# Patient Record
Sex: FEMALE | Race: WHITE | NOT HISPANIC OR LATINO | URBAN - METROPOLITAN AREA
[De-identification: names, ages, dates, MRNs, and addresses within clinical notes are randomized per-mention and may not be internally consistent; named-entity substitution may affect disease eponyms.]

---

## 2018-10-12 ENCOUNTER — OUTPATIENT (OUTPATIENT)
Dept: OUTPATIENT SERVICES | Facility: HOSPITAL | Age: 53
LOS: 1 days | End: 2018-10-12
Payer: COMMERCIAL

## 2018-10-12 ENCOUNTER — APPOINTMENT (OUTPATIENT)
Dept: CT IMAGING | Facility: CLINIC | Age: 53
End: 2018-10-12
Payer: COMMERCIAL

## 2018-10-12 DIAGNOSIS — Z00.8 ENCOUNTER FOR OTHER GENERAL EXAMINATION: ICD-10-CM

## 2018-10-12 DIAGNOSIS — Z87.42 PERSONAL HISTORY OF OTHER DISEASES OF THE FEMALE GENITAL TRACT: Chronic | ICD-10-CM

## 2018-10-12 PROCEDURE — 74178 CT ABD&PLV WO CNTR FLWD CNTR: CPT | Mod: 26

## 2018-10-12 PROCEDURE — 82565 ASSAY OF CREATININE: CPT

## 2018-10-12 PROCEDURE — 74178 CT ABD&PLV WO CNTR FLWD CNTR: CPT

## 2018-10-16 ENCOUNTER — RESULT REVIEW (OUTPATIENT)
Age: 53
End: 2018-10-16

## 2022-07-07 ENCOUNTER — NON-APPOINTMENT (OUTPATIENT)
Age: 57
End: 2022-07-07

## 2022-07-15 ENCOUNTER — NON-APPOINTMENT (OUTPATIENT)
Age: 57
End: 2022-07-15

## 2022-07-19 ENCOUNTER — APPOINTMENT (OUTPATIENT)
Dept: SURGERY | Facility: CLINIC | Age: 57
End: 2022-07-19

## 2022-07-19 VITALS
DIASTOLIC BLOOD PRESSURE: 83 MMHG | HEART RATE: 87 BPM | SYSTOLIC BLOOD PRESSURE: 171 MMHG | HEIGHT: 69 IN | BODY MASS INDEX: 22.66 KG/M2 | WEIGHT: 153 LBS | TEMPERATURE: 98.1 F | OXYGEN SATURATION: 98 %

## 2022-07-19 DIAGNOSIS — K82.4 CHOLESTEROLOSIS OF GALLBLADDER: ICD-10-CM

## 2022-07-19 DIAGNOSIS — Z86.39 PERSONAL HISTORY OF OTHER ENDOCRINE, NUTRITIONAL AND METABOLIC DISEASE: ICD-10-CM

## 2022-07-19 DIAGNOSIS — Z80.0 FAMILY HISTORY OF MALIGNANT NEOPLASM OF DIGESTIVE ORGANS: ICD-10-CM

## 2022-07-19 DIAGNOSIS — Z82.49 FAMILY HISTORY OF ISCHEMIC HEART DISEASE AND OTHER DISEASES OF THE CIRCULATORY SYSTEM: ICD-10-CM

## 2022-07-19 DIAGNOSIS — Z86.79 PERSONAL HISTORY OF OTHER DISEASES OF THE CIRCULATORY SYSTEM: ICD-10-CM

## 2022-07-19 PROCEDURE — 99204 OFFICE O/P NEW MOD 45 MIN: CPT

## 2022-07-19 NOTE — ASSESSMENT
[FreeTextEntry1] : I have discussed with pt that as the size of a gallbladder polyp goes up so does the risk of cancer. Since her polyp is 7 mm I would therefore recommend she have her gallbladder removed.\par I have discussed all of the risks, benefits and option. Pt would like to schedule a Robotic assisted laparoscopic cholecystectomy.

## 2022-07-19 NOTE — PHYSICAL EXAM
[JVD] : no jugular venous distention  [Normal Thyroid] : the thyroid was normal [Carotid Bruits] : no carotid bruits [Normal Breath Sounds] : Normal breath sounds [Normal Heart Sounds] : normal heart sounds [Normal Rate and Rhythm] : normal rate and rhythm [No Rash or Lesion] : No rash or lesion [Alert] : alert [Oriented to Person] : oriented to person [Oriented to Place] : oriented to place [Oriented to Time] : oriented to time [Calm] : calm [de-identified] : well developed white female in no distress [de-identified] : noninjected and nonicteric [de-identified] : without adenopathy [de-identified] : without masses or nipple discharge. Axilla without adenopathy [de-identified] : normal bowel sounds, without distension or tenderness. [de-identified] : without hernia [de-identified] : without calf pain or swelling

## 2022-07-19 NOTE — HISTORY OF PRESENT ILLNESS
[de-identified] : gallbladder polyp [de-identified] : 57 year old white female who presents after a sonogram showed she has a gallbladder polyp. Pt states her live enzymes where elevated so her PMD order the sonogram. She denies any RUQ pain or fatty food intolerance. She has not had any episodes of jaundice. She does have a family history of gallbladder disease.

## 2022-08-31 ENCOUNTER — OUTPATIENT (OUTPATIENT)
Dept: OUTPATIENT SERVICES | Facility: HOSPITAL | Age: 57
LOS: 1 days | End: 2022-08-31
Payer: COMMERCIAL

## 2022-08-31 VITALS
WEIGHT: 153 LBS | OXYGEN SATURATION: 99 % | RESPIRATION RATE: 16 BRPM | SYSTOLIC BLOOD PRESSURE: 140 MMHG | DIASTOLIC BLOOD PRESSURE: 74 MMHG | HEART RATE: 72 BPM | TEMPERATURE: 97 F

## 2022-08-31 DIAGNOSIS — K82.4 CHOLESTEROLOSIS OF GALLBLADDER: ICD-10-CM

## 2022-08-31 DIAGNOSIS — Z90.721 ACQUIRED ABSENCE OF OVARIES, UNILATERAL: Chronic | ICD-10-CM

## 2022-08-31 DIAGNOSIS — Z01.818 ENCOUNTER FOR OTHER PREPROCEDURAL EXAMINATION: ICD-10-CM

## 2022-08-31 DIAGNOSIS — Z98.890 OTHER SPECIFIED POSTPROCEDURAL STATES: Chronic | ICD-10-CM

## 2022-08-31 DIAGNOSIS — Z87.42 PERSONAL HISTORY OF OTHER DISEASES OF THE FEMALE GENITAL TRACT: Chronic | ICD-10-CM

## 2022-08-31 DIAGNOSIS — E11.9 TYPE 2 DIABETES MELLITUS WITHOUT COMPLICATIONS: ICD-10-CM

## 2022-08-31 LAB
A1C WITH ESTIMATED AVERAGE GLUCOSE RESULT: 8.6 % — HIGH (ref 4–5.6)
ALBUMIN SERPL ELPH-MCNC: 4.1 G/DL — SIGNIFICANT CHANGE UP (ref 3.3–5)
ALP SERPL-CCNC: 310 U/L — HIGH (ref 40–120)
ALT FLD-CCNC: 76 U/L — SIGNIFICANT CHANGE UP (ref 12–78)
ANION GAP SERPL CALC-SCNC: 5 MMOL/L — SIGNIFICANT CHANGE UP (ref 5–17)
AST SERPL-CCNC: 44 U/L — HIGH (ref 15–37)
BILIRUB SERPL-MCNC: 1.5 MG/DL — HIGH (ref 0.2–1.2)
BLD GP AB SCN SERPL QL: SIGNIFICANT CHANGE UP
BUN SERPL-MCNC: 16 MG/DL — SIGNIFICANT CHANGE UP (ref 7–23)
CALCIUM SERPL-MCNC: 10.1 MG/DL — SIGNIFICANT CHANGE UP (ref 8.5–10.1)
CHLORIDE SERPL-SCNC: 106 MMOL/L — SIGNIFICANT CHANGE UP (ref 96–108)
CO2 SERPL-SCNC: 29 MMOL/L — SIGNIFICANT CHANGE UP (ref 22–31)
CREAT SERPL-MCNC: 0.66 MG/DL — SIGNIFICANT CHANGE UP (ref 0.5–1.3)
EGFR: 101 ML/MIN/1.73M2 — SIGNIFICANT CHANGE UP
ESTIMATED AVERAGE GLUCOSE: 200 MG/DL — HIGH (ref 68–114)
GLUCOSE SERPL-MCNC: 205 MG/DL — HIGH (ref 70–99)
HCT VFR BLD CALC: 45 % — SIGNIFICANT CHANGE UP (ref 34.5–45)
HGB BLD-MCNC: 15.1 G/DL — SIGNIFICANT CHANGE UP (ref 11.5–15.5)
MCHC RBC-ENTMCNC: 31 PG — SIGNIFICANT CHANGE UP (ref 27–34)
MCHC RBC-ENTMCNC: 33.6 GM/DL — SIGNIFICANT CHANGE UP (ref 32–36)
MCV RBC AUTO: 92.4 FL — SIGNIFICANT CHANGE UP (ref 80–100)
NRBC # BLD: 0 /100 WBCS — SIGNIFICANT CHANGE UP (ref 0–0)
PLATELET # BLD AUTO: 317 K/UL — SIGNIFICANT CHANGE UP (ref 150–400)
POTASSIUM SERPL-MCNC: 4.4 MMOL/L — SIGNIFICANT CHANGE UP (ref 3.5–5.3)
POTASSIUM SERPL-SCNC: 4.4 MMOL/L — SIGNIFICANT CHANGE UP (ref 3.5–5.3)
PROT SERPL-MCNC: 8.1 G/DL — SIGNIFICANT CHANGE UP (ref 6–8.3)
RBC # BLD: 4.87 M/UL — SIGNIFICANT CHANGE UP (ref 3.8–5.2)
RBC # FLD: 12 % — SIGNIFICANT CHANGE UP (ref 10.3–14.5)
SODIUM SERPL-SCNC: 140 MMOL/L — SIGNIFICANT CHANGE UP (ref 135–145)
WBC # BLD: 6.86 K/UL — SIGNIFICANT CHANGE UP (ref 3.8–10.5)
WBC # FLD AUTO: 6.86 K/UL — SIGNIFICANT CHANGE UP (ref 3.8–10.5)

## 2022-08-31 PROCEDURE — 86901 BLOOD TYPING SEROLOGIC RH(D): CPT

## 2022-08-31 PROCEDURE — 36415 COLL VENOUS BLD VENIPUNCTURE: CPT

## 2022-08-31 PROCEDURE — 86850 RBC ANTIBODY SCREEN: CPT

## 2022-08-31 PROCEDURE — 85027 COMPLETE CBC AUTOMATED: CPT

## 2022-08-31 PROCEDURE — G0463: CPT

## 2022-08-31 PROCEDURE — 80053 COMPREHEN METABOLIC PANEL: CPT

## 2022-08-31 PROCEDURE — 86900 BLOOD TYPING SEROLOGIC ABO: CPT

## 2022-08-31 PROCEDURE — 83036 HEMOGLOBIN GLYCOSYLATED A1C: CPT

## 2022-08-31 RX ORDER — RAMIPRIL 5 MG
0 CAPSULE ORAL
Qty: 0 | Refills: 0 | DISCHARGE

## 2022-08-31 RX ORDER — METFORMIN HYDROCHLORIDE 850 MG/1
0 TABLET ORAL
Qty: 0 | Refills: 0 | DISCHARGE

## 2022-08-31 RX ORDER — ATORVASTATIN CALCIUM 80 MG/1
0 TABLET, FILM COATED ORAL
Qty: 0 | Refills: 0 | DISCHARGE

## 2022-08-31 NOTE — H&P PST ADULT - MUSCULOSKELETAL
negative ROM intact/no calf tenderness/decreased ROM normal/ROM intact/no calf tenderness/normal gait/strength 5/5 bilateral upper extremities/strength 5/5 bilateral lower extremities

## 2022-08-31 NOTE — H&P PST ADULT - PROBLEM SELECTOR PLAN 3
Medical clearance needed as per surgeon. CBC, Comprehensive panel, T&S and HgbA1c ordered. EKG from 6/29/22 received in chart. Pre-op instructions and surgical scrubs given and pt verbalized understanding. COVID19 PCR testing to be done within 72 hours of surgery at [facility].

## 2022-08-31 NOTE — H&P PST ADULT - NSICDXPASTMEDICALHX_GEN_ALL_CORE_FT
PAST MEDICAL HISTORY:  HTN (hypertension)     Prediabetes      PAST MEDICAL HISTORY:  Cholesterolosis of gallbladder     HTN (hypertension)     Hyperlipidemia     Type 2 diabetes mellitus

## 2022-08-31 NOTE — H&P PST ADULT - BP NONINVASIVE MEAN (MM HG)
Continue Regimen: Applying sunscreen daily Plan: Discussed cosmetic options Initiate Treatment: Apply tri-ruben daily to the face at night Detail Level: Zone 96

## 2022-08-31 NOTE — H&P PST ADULT - HISTORY OF PRESENT ILLNESS
56 y/o female with PMH of HTN and Type 2 DM here for PST. Pt states she had abnormal LFT's and s/p sonogram "which showed 2 polyps".  58 y/o female with PMH of HTN and Type 2 DM here for PST. Pt states she had abnormal LFT's and s/p sonogram "which showed 2 polyps". Pt denies n/v/d and abdominal pain. Pt electing for robotic assisted laparoscopic cholecystectomy on 9/16/2022.

## 2022-08-31 NOTE — H&P PST ADULT - NSICDXPASTSURGICALHX_GEN_ALL_CORE_FT
PAST SURGICAL HISTORY:  H/O menorrhagia h/o ablation, s/p Novosure 5/2013    S/P left oophorectomy (2014)     PAST SURGICAL HISTORY:  H/O menorrhagia h/o ablation, s/p Novosure 5/2013    S/P colonoscopy (2017)    S/P left oophorectomy (2014)

## 2022-08-31 NOTE — H&P PST ADULT - PROBLEM SELECTOR PLAN 2
Instructed pt of need for endocrine clearance if HgbA1c is 9 or greater. No Metformin morning of surgery. Hold Jardiance 3 days before surgery. Pt verbalized understanding.

## 2022-08-31 NOTE — H&P PST ADULT - FUNCTIONAL ASSESSMENT - BASIC MOBILITY SCORE HIDDEN
[FreeTextEntry1] : Patient is a 66-year-old, status post right carotid endarterectomy.  Patient currently on aspirin.  No CVA or TIA. 24

## 2022-08-31 NOTE — H&P PST ADULT - NSSUBSTANCEUSE_GEN_ALL_CORE_SD
Review of Systems: GENERAL:  Patient denies fevers, night sweats, anorexia, weight loss, but complains of:  chills and excessive fatigue  EYES:  Patient denies significant visual difficulties, diplopia  ENT/MOUTH:  Patient denies problems with hearing, sore throat, mucositis or mouth sores, sinus drainage  ENDOCRINE:  Patient denies diabetes, thyroid disease, hormone replacement, hot flashes or night sweats  HEMATOLOGIC/LYMPHATIC: Patient denies easy bruising or bleeding, tender or palpable lymph nodes  RESPIRATORY:  Patient denies  chest pain, cough, hemoptysis, but complains of:  dyspnea  CARDIOVASCULAR:  Patient denies anginal chest pain, palpitations, orthopnea, peripheral edema   GASTROINTESTINAL: Patient denies vomiting, GI bleeding, constipation, change in bowel habits, heartburn, early satiety, but complains of:  nausea and diarrhea   (MALE): Patient denies hematuria, dysuria, increased frequency, urgency, hesitancy, incontinence  MUSCULOSKELETAL:  Patient denies  joint pain, swelling or redness, decreased range of motion  SKIN:  Patient denies chronic rashes, inflammation, ulcerations or skin changes  NEUROLOGIC:  Patient denies headache, blurred vision, areas of focal weakness or numbness, abnormal gait, sensory problems     Denies caffeine and drug use

## 2022-09-13 PROBLEM — E11.9 TYPE 2 DIABETES MELLITUS WITHOUT COMPLICATIONS: Chronic | Status: ACTIVE | Noted: 2022-08-31

## 2022-09-13 PROBLEM — K82.4 CHOLESTEROLOSIS OF GALLBLADDER: Chronic | Status: ACTIVE | Noted: 2022-08-31

## 2022-09-13 PROBLEM — E78.5 HYPERLIPIDEMIA, UNSPECIFIED: Chronic | Status: ACTIVE | Noted: 2022-08-31

## 2022-09-14 ENCOUNTER — OUTPATIENT (OUTPATIENT)
Dept: OUTPATIENT SERVICES | Facility: HOSPITAL | Age: 57
LOS: 1 days | End: 2022-09-14
Payer: COMMERCIAL

## 2022-09-14 DIAGNOSIS — Z98.890 OTHER SPECIFIED POSTPROCEDURAL STATES: Chronic | ICD-10-CM

## 2022-09-14 DIAGNOSIS — Z90.721 ACQUIRED ABSENCE OF OVARIES, UNILATERAL: Chronic | ICD-10-CM

## 2022-09-14 DIAGNOSIS — Z87.42 PERSONAL HISTORY OF OTHER DISEASES OF THE FEMALE GENITAL TRACT: Chronic | ICD-10-CM

## 2022-09-14 DIAGNOSIS — Z20.828 CONTACT WITH AND (SUSPECTED) EXPOSURE TO OTHER VIRAL COMMUNICABLE DISEASES: ICD-10-CM

## 2022-09-14 LAB — SARS-COV-2 RNA SPEC QL NAA+PROBE: SIGNIFICANT CHANGE UP

## 2022-09-14 PROCEDURE — U0003: CPT

## 2022-09-14 PROCEDURE — U0005: CPT

## 2022-09-15 ENCOUNTER — TRANSCRIPTION ENCOUNTER (OUTPATIENT)
Age: 57
End: 2022-09-15

## 2022-09-15 NOTE — ASU PATIENT PROFILE, ADULT - ALCOHOL USE HISTORY SINGLE SELECT
[Initial Visit] : an initial visit for [Other: ____] : [unfilled] [FreeTextEntry2] : pain on both elbows never

## 2022-09-15 NOTE — ASU PATIENT PROFILE, ADULT - FALL HARM RISK - UNIVERSAL INTERVENTIONS
Bed in lowest position, wheels locked, appropriate side rails in place/Call bell, personal items and telephone in reach/Instruct patient to call for assistance before getting out of bed or chair/Non-slip footwear when patient is out of bed/[place] to call system/Physically safe environment - no spills, clutter or unnecessary equipment/Purposeful Proactive Rounding/Room/bathroom lighting operational, light cord in reach

## 2022-09-15 NOTE — ASU PATIENT PROFILE, ADULT - PATIENT'S PREFERRED PRONOUN
3/23/2021    Lucy Chapin  [address] [place] [phone]        Dear uLcy Chapin,    This is a reminder  for you to schedule an appointment with GASTROENTEROLOGY for the following:     Colonoscopy    Please call one of the offices listed below to schedule a date and time that is convenient for you.     Sincerely,    MD CASTILLO LESLIE MD NALINI GUDA, MD TEDD CAIN, MD       Department of Gastroenterology    [address] [id number] [phone]    Department of Gastroenterology  [place] [address] [place], [place]  [id number] [phone]         Her/She

## 2022-09-16 ENCOUNTER — RESULT REVIEW (OUTPATIENT)
Age: 57
End: 2022-09-16

## 2022-09-16 ENCOUNTER — TRANSCRIPTION ENCOUNTER (OUTPATIENT)
Age: 57
End: 2022-09-16

## 2022-09-16 ENCOUNTER — OUTPATIENT (OUTPATIENT)
Dept: OUTPATIENT SERVICES | Facility: HOSPITAL | Age: 57
LOS: 1 days | End: 2022-09-16
Payer: COMMERCIAL

## 2022-09-16 ENCOUNTER — APPOINTMENT (OUTPATIENT)
Dept: SURGERY | Facility: HOSPITAL | Age: 57
End: 2022-09-16

## 2022-09-16 VITALS
RESPIRATION RATE: 16 BRPM | HEIGHT: 69 IN | WEIGHT: 153 LBS | DIASTOLIC BLOOD PRESSURE: 71 MMHG | HEART RATE: 67 BPM | SYSTOLIC BLOOD PRESSURE: 145 MMHG | OXYGEN SATURATION: 98 % | TEMPERATURE: 98 F

## 2022-09-16 VITALS
OXYGEN SATURATION: 96 % | RESPIRATION RATE: 12 BRPM | SYSTOLIC BLOOD PRESSURE: 157 MMHG | DIASTOLIC BLOOD PRESSURE: 77 MMHG | TEMPERATURE: 97 F | HEART RATE: 69 BPM

## 2022-09-16 DIAGNOSIS — Z98.890 OTHER SPECIFIED POSTPROCEDURAL STATES: Chronic | ICD-10-CM

## 2022-09-16 DIAGNOSIS — Z90.721 ACQUIRED ABSENCE OF OVARIES, UNILATERAL: Chronic | ICD-10-CM

## 2022-09-16 DIAGNOSIS — Z87.42 PERSONAL HISTORY OF OTHER DISEASES OF THE FEMALE GENITAL TRACT: Chronic | ICD-10-CM

## 2022-09-16 DIAGNOSIS — K82.4 CHOLESTEROLOSIS OF GALLBLADDER: ICD-10-CM

## 2022-09-16 LAB — ABO RH CONFIRMATION: SIGNIFICANT CHANGE UP

## 2022-09-16 PROCEDURE — C9399: CPT

## 2022-09-16 PROCEDURE — 47563 LAPARO CHOLECYSTECTOMY/GRAPH: CPT

## 2022-09-16 PROCEDURE — 82962 GLUCOSE BLOOD TEST: CPT

## 2022-09-16 PROCEDURE — C1889: CPT

## 2022-09-16 PROCEDURE — 88304 TISSUE EXAM BY PATHOLOGIST: CPT | Mod: 26

## 2022-09-16 PROCEDURE — S2900: CPT

## 2022-09-16 PROCEDURE — 88304 TISSUE EXAM BY PATHOLOGIST: CPT

## 2022-09-16 DEVICE — LIGATING CLIPS WECK HEMOLOK POLYMER MEDIUM-LARGE (GREEN) 6: Type: IMPLANTABLE DEVICE | Status: FUNCTIONAL

## 2022-09-16 RX ORDER — IBUPROFEN 200 MG
1 TABLET ORAL
Qty: 20 | Refills: 0
Start: 2022-09-16

## 2022-09-16 RX ORDER — RAMIPRIL 5 MG
0 CAPSULE ORAL
Qty: 0 | Refills: 0 | DISCHARGE

## 2022-09-16 RX ORDER — ATORVASTATIN CALCIUM 80 MG/1
0 TABLET, FILM COATED ORAL
Qty: 0 | Refills: 0 | DISCHARGE

## 2022-09-16 RX ORDER — HYDROMORPHONE HYDROCHLORIDE 2 MG/ML
0.5 INJECTION INTRAMUSCULAR; INTRAVENOUS; SUBCUTANEOUS
Refills: 0 | Status: DISCONTINUED | OUTPATIENT
Start: 2022-09-16 | End: 2022-09-16

## 2022-09-16 RX ORDER — INDOCYANINE GREEN 25 MG
25 KIT INTRAVASCULAR; INTRAVENOUS ONCE
Refills: 0 | Status: DISCONTINUED | OUTPATIENT
Start: 2022-09-16 | End: 2022-09-16

## 2022-09-16 RX ORDER — SODIUM CHLORIDE 9 MG/ML
1000 INJECTION, SOLUTION INTRAVENOUS
Refills: 0 | Status: DISCONTINUED | OUTPATIENT
Start: 2022-09-16 | End: 2022-09-16

## 2022-09-16 RX ORDER — ONDANSETRON 8 MG/1
4 TABLET, FILM COATED ORAL ONCE
Refills: 0 | Status: DISCONTINUED | OUTPATIENT
Start: 2022-09-16 | End: 2022-09-16

## 2022-09-16 RX ORDER — OXYCODONE AND ACETAMINOPHEN 5; 325 MG/1; MG/1
1 TABLET ORAL
Qty: 20 | Refills: 0
Start: 2022-09-16

## 2022-09-16 RX ORDER — INDOCYANINE GREEN 25 MG
5 KIT INTRAVASCULAR; INTRAVENOUS ONCE
Refills: 0 | Status: COMPLETED | OUTPATIENT
Start: 2022-09-16 | End: 2022-09-16

## 2022-09-16 RX ORDER — DOCUSATE SODIUM 100 MG
1 CAPSULE ORAL
Qty: 60 | Refills: 0
Start: 2022-09-16

## 2022-09-16 RX ORDER — OXYCODONE HYDROCHLORIDE 5 MG/1
5 TABLET ORAL ONCE
Refills: 0 | Status: DISCONTINUED | OUTPATIENT
Start: 2022-09-16 | End: 2022-09-16

## 2022-09-16 RX ORDER — METFORMIN HYDROCHLORIDE 850 MG/1
0 TABLET ORAL
Qty: 0 | Refills: 0 | DISCHARGE

## 2022-09-16 RX ORDER — EMPAGLIFLOZIN 10 MG/1
0 TABLET, FILM COATED ORAL
Qty: 0 | Refills: 1 | DISCHARGE

## 2022-09-16 RX ADMIN — SODIUM CHLORIDE 75 MILLILITER(S): 9 INJECTION, SOLUTION INTRAVENOUS at 07:10

## 2022-09-16 RX ADMIN — SODIUM CHLORIDE 75 MILLILITER(S): 9 INJECTION, SOLUTION INTRAVENOUS at 09:36

## 2022-09-16 RX ADMIN — INDOCYANINE GREEN 5 MILLIGRAM(S): KIT INTRAVASCULAR; INTRAVENOUS at 07:08

## 2022-09-16 NOTE — BRIEF OPERATIVE NOTE - NSICDXBRIEFPOSTOP_GEN_ALL_CORE_FT
POST-OP DIAGNOSIS:  Cholesterolosis of gallbladder 16-Sep-2022 10:32:48  Gabino Hernadez  Abdominal adhesions 16-Sep-2022 10:35:44 omental adhesions to anterior abdominal wall Gabino Hernadez

## 2022-09-16 NOTE — BRIEF OPERATIVE NOTE - OPERATION/FINDINGS
Fluid-filled gallbladder.  Triangle of safety/critical view obtained prior to ligation and transection of cystic artery and cystic duct.  Location of cystic duct further confirmed with intra-operative cholangiogram utilizing ICG IV dye administered pre-op in holding, and "firefly" mode on DaVinci camera.  Omental adhesions to anterior abdominal wall.

## 2022-09-16 NOTE — BRIEF OPERATIVE NOTE - NSICDXBRIEFPROCEDURE_GEN_ALL_CORE_FT
PROCEDURES:  Robot-assisted cholecystectomy with cholangiography using da Sydney Xi 16-Sep-2022 10:33:04  Gabino Hernadez  Laparoscopic lysis of abdominal adhesions 16-Sep-2022 10:36:54  Gabino Hernadez

## 2022-09-16 NOTE — ASU DISCHARGE PLAN (ADULT/PEDIATRIC) - CARE PROVIDER_API CALL
Pedrito Gama)  Surgery  [address] [place] [id number]  Phone: [phone]  Fax: [phone]  Follow Up Time:

## 2022-09-16 NOTE — ASU DISCHARGE PLAN (ADULT/PEDIATRIC) - ASU DC SPECIAL INSTRUCTIONSFT
You may remove the clear adhesive and gauze dressings and shower in 48 hours.  Do not remove steri-strips.  Do not let water hit wounds directly, do not rub incisions.      No heavy lifting (nothing heavier than 5 lbs.), no strenuous physical activity, no exercise.      You may perform your usual daily tasks as tolerated.    Eat a low-fat diet for the next few weeks.            Do not drive for 24 hours following anesthesia.  Do not drive while taking narcotic pain meds.  Do not drive until pain-free.      You are encouraged to walk as much as possible to prevent blood clots in the legs, to maintain lung health after surgery/anesthesia, and to prevent constipation after surgery.      Continue to use the incentive spirometer at home.    Take percocet as prescribed for pain.  Take colace as prescribed while taking narcotic pain medication to prevent constipation.  Take ibuprofen as prescribed for moderate pain - take with food.      Resume all of your usual daily medications as instructed in the discharge medication reconciliation.    Follow-up with Dr. Gama in his office next week - call office for appointment if not already made.

## 2022-09-19 ENCOUNTER — NON-APPOINTMENT (OUTPATIENT)
Age: 57
End: 2022-09-19

## 2022-09-19 LAB — SURGICAL PATHOLOGY STUDY: SIGNIFICANT CHANGE UP

## 2022-09-22 ENCOUNTER — APPOINTMENT (OUTPATIENT)
Dept: SURGERY | Facility: CLINIC | Age: 57
End: 2022-09-22

## 2022-09-22 DIAGNOSIS — Z90.49 ACQUIRED ABSENCE OF OTHER SPECIFIED PARTS OF DIGESTIVE TRACT: ICD-10-CM

## 2022-09-22 PROCEDURE — 99024 POSTOP FOLLOW-UP VISIT: CPT

## 2022-09-22 NOTE — HISTORY OF PRESENT ILLNESS
[de-identified] : s/p robotic assisted laparoscopic cholecystectomy on 9/16/22 [de-identified] : Pt doing well, normal GI functioning, no fevers or chills.

## 2022-09-22 NOTE — PLAN
[FreeTextEntry1] : I have discussed with pt the abnormal appearance of her liver and recommended that she see a liver specialist.\par low fat diet

## 2022-09-22 NOTE — PHYSICAL EXAM
[Normal Breath Sounds] : Normal breath sounds [Normal Heart Sounds] : normal heart sounds [Normal Rate and Rhythm] : normal rate and rhythm [Calm] : calm [de-identified] : well developed white female in no distress [de-identified] : nonicteric [de-identified] : normal bowel sounds, without distension or tenderness.\par Incisions clean and closed. [de-identified] : without calf pain or swelling

## 2022-10-18 ENCOUNTER — NON-APPOINTMENT (OUTPATIENT)
Age: 57
End: 2022-10-18

## 2022-10-19 ENCOUNTER — APPOINTMENT (OUTPATIENT)
Dept: GASTROENTEROLOGY | Facility: CLINIC | Age: 57
End: 2022-10-19

## 2022-10-19 ENCOUNTER — TRANSCRIPTION ENCOUNTER (OUTPATIENT)
Age: 57
End: 2022-10-19

## 2022-10-19 ENCOUNTER — NON-APPOINTMENT (OUTPATIENT)
Age: 57
End: 2022-10-19

## 2022-10-19 ENCOUNTER — LABORATORY RESULT (OUTPATIENT)
Age: 57
End: 2022-10-19

## 2022-10-19 VITALS
BODY MASS INDEX: 23.04 KG/M2 | DIASTOLIC BLOOD PRESSURE: 80 MMHG | WEIGHT: 156 LBS | HEART RATE: 79 BPM | OXYGEN SATURATION: 98 % | SYSTOLIC BLOOD PRESSURE: 123 MMHG

## 2022-10-19 VITALS — TEMPERATURE: 98.7 F | HEIGHT: 69 IN | RESPIRATION RATE: 16 BRPM

## 2022-10-19 PROCEDURE — 99205 OFFICE O/P NEW HI 60 MIN: CPT

## 2022-10-19 RX ORDER — METFORMIN HYDROCHLORIDE 500 MG/1
500 TABLET, COATED ORAL
Refills: 0 | Status: ACTIVE | COMMUNITY

## 2022-10-19 RX ORDER — ATORVASTATIN CALCIUM 10 MG/1
10 TABLET, FILM COATED ORAL
Qty: 90 | Refills: 0 | Status: ACTIVE | COMMUNITY
Start: 2022-09-27

## 2022-10-19 RX ORDER — EMPAGLIFLOZIN 10 MG/1
10 TABLET, FILM COATED ORAL
Qty: 90 | Refills: 0 | Status: ACTIVE | COMMUNITY
Start: 2022-04-25

## 2022-10-19 NOTE — PHYSICAL EXAM
[Non-Tender] : non-tender [Smooth] : smooth [General Appearance - Alert] : alert [General Appearance - In No Acute Distress] : in no acute distress [Sclera] : the sclera and conjunctiva were normal [Outer Ear] : the ears and nose were normal in appearance [Oropharynx] : the oropharynx was normal [Neck Appearance] : the appearance of the neck was normal [Auscultation Breath Sounds / Voice Sounds] : lungs were clear to auscultation bilaterally [Heart Rate And Rhythm] : heart rate was normal and rhythm regular [Edema] : there was no peripheral edema [Bowel Sounds] : normal bowel sounds [Abdomen Soft] : soft [Abdomen Tenderness] : non-tender [Abdomen Mass (___ Cm)] : no abdominal mass palpated [Abnormal Walk] : normal gait [Nail Clubbing] : no clubbing  or cyanosis of the fingernails [Musculoskeletal - Swelling] : no joint swelling seen [Motor Tone] : muscle strength and tone were normal [Skin Color & Pigmentation] : normal skin color and pigmentation [Skin Turgor] : normal skin turgor [] : no rash [No Focal Deficits] : no focal deficits [Oriented To Time, Place, And Person] : oriented to person, place, and time [Impaired Insight] : insight and judgment were intact [Affect] : the affect was normal [Scleral Icterus] : No Scleral Icterus [Abdominal  Ascites] : no ascites [Splenomegaly] : no splenomegaly [Asterixis] : no asterixis observed [Jaundice] : No jaundice [Palmar Erythema] : no Palmar Erythema [Depression] : no depression [Hallucinations] : ~T no ~M hallucinations

## 2022-10-19 NOTE — HISTORY OF PRESENT ILLNESS
[de-identified] : OMAR BARBOSA is a 57 year old female with a PMH significant for DM, HTN, HLD, and cholecystectomy (9/2022). \par \par She presents today for evaluation of elevated LFTs. On chart review, pt noted to have elevated liver enzymes starting in July 2021. Pt reports she took 2 Sudafed tablets at that time which caused scleral icterus. Since that time LFTs have remained elevated. Labs 9/28/22 - bilirubin 1.3, AST 51, ALT 79, , , iron studies normal except ferritin elevated to 174, HCV negative. MRI Abdomen non contrast 9/10/22 - liver normal, no focal lesions, CBD normal.\par \par Denies fatigue, malaise, arthralgias, myalgias, pruritus, recent infection, abdominal pain or distension, jaundice, hematemesis, hematochezia, dark urine, confusion, unintentional weight loss or gain. Denies excessive alcohol consumption. Denies OTC/herbal supplements. Denies viral hepatitis risk factors. Denies new medication in the last year. Denies family history of liver disease, sudden death or late trimester miscarriages.

## 2022-10-19 NOTE — ADDENDUM
[FreeTextEntry1] : I, Marysol Hannah NP, acted as scribe for SHAHRIAR New for this patient encounter.

## 2022-10-19 NOTE — ASSESSMENT
[FreeTextEntry1] : OMAR BARBOSA is a 57 year old female with a PMH significant for DM, HTN, HLD, and cholecystectomy (9/2022). \par \par Elevated LFTs\par -DILI vs. Autoimmune Hepatitis\par -Labs ordered to rule out competing etiologies of liver disease\par -LFT elevation started in July 2021 after taking 2 Sudafed tablets which resulted in scleral icterus\par -LFTs remain elevated more than 1 year later\par -MRI abdomen unremarkable\par -if diagnosis still unclear after labs, will proceed with liver biopsy\par \par Follow up in 6 weeks

## 2022-10-20 ENCOUNTER — APPOINTMENT (OUTPATIENT)
Dept: SURGERY | Facility: CLINIC | Age: 57
End: 2022-10-20

## 2022-10-20 LAB
A1AT SERPL-MCNC: 131 MG/DL
AFP-TM SERPL-MCNC: 4.6 NG/ML
ALBUMIN SERPL ELPH-MCNC: 5 G/DL
ALP BLD-CCNC: 311 U/L
ALT SERPL-CCNC: 52 U/L
ANION GAP SERPL CALC-SCNC: 14 MMOL/L
AST SERPL-CCNC: 43 U/L
BASOPHILS # BLD AUTO: 0.03 K/UL
BASOPHILS NFR BLD AUTO: 0.4 %
BILIRUB INDIRECT SERPL-MCNC: 0.7 MG/DL
BILIRUB SERPL-MCNC: 1 MG/DL
BUN SERPL-MCNC: 14 MG/DL
CALCIUM SERPL-MCNC: 9.9 MG/DL
CERULOPLASMIN SERPL-MCNC: 24 MG/DL
CHLORIDE SERPL-SCNC: 104 MMOL/L
CO2 SERPL-SCNC: 24 MMOL/L
CREAT SERPL-MCNC: 0.58 MG/DL
EGFR: 104 ML/MIN/1.73M2
EOSINOPHIL # BLD AUTO: 0.03 K/UL
EOSINOPHIL NFR BLD AUTO: 0.4 %
GLUCOSE SERPL-MCNC: 294 MG/DL
HBV CORE IGG+IGM SER QL: NONREACTIVE
HBV SURFACE AB SER QL: NONREACTIVE
HCT VFR BLD CALC: 45.1 %
HEPATITIS A IGG ANTIBODY: NONREACTIVE
HGB BLD-MCNC: 14.9 G/DL
IMM GRANULOCYTES NFR BLD AUTO: 0.1 %
INR PPP: 1.11 RATIO
LYMPHOCYTES # BLD AUTO: 2.25 K/UL
LYMPHOCYTES NFR BLD AUTO: 31.1 %
MAN DIFF?: NORMAL
MCHC RBC-ENTMCNC: 31.1 PG
MCHC RBC-ENTMCNC: 33 GM/DL
MCV RBC AUTO: 94.2 FL
MONOCYTES # BLD AUTO: 0.49 K/UL
MONOCYTES NFR BLD AUTO: 6.8 %
NEUTROPHILS # BLD AUTO: 4.43 K/UL
NEUTROPHILS NFR BLD AUTO: 61.2 %
NT-PROBNP SERPL-MCNC: 47 PG/ML
PLATELET # BLD AUTO: 325 K/UL
POTASSIUM SERPL-SCNC: 4.4 MMOL/L
PROT SERPL-MCNC: 7.5 G/DL
PT BLD: 13 SEC
RBC # BLD: 4.79 M/UL
RBC # FLD: 12.8 %
SODIUM SERPL-SCNC: 142 MMOL/L
TSH SERPL-ACNC: 1.69 UIU/ML
WBC # FLD AUTO: 7.24 K/UL

## 2022-10-21 LAB
ANA SER IF-ACNC: NEGATIVE
IGG SER QL IEP: 863 MG/DL
LKM AB SER QL IF: <20.1 UNITS
MITOCHONDRIA AB SER IF-ACNC: NORMAL
SMOOTH MUSCLE AB SER QL IF: NORMAL

## 2022-11-01 LAB
ALP BLD-CCNC: 322 IU/L
ALP BONE CFR SERPL: 31 %
ALP INTEST CFR SERPL: 0 %
ALP LIVER CFR SERPL: 69 %
SOLUBLE LIVER IGG SER IA-ACNC: 1.5

## 2022-11-30 ENCOUNTER — APPOINTMENT (OUTPATIENT)
Dept: GASTROENTEROLOGY | Facility: CLINIC | Age: 57
End: 2022-11-30

## 2022-11-30 VITALS
BODY MASS INDEX: 22.96 KG/M2 | DIASTOLIC BLOOD PRESSURE: 85 MMHG | HEART RATE: 73 BPM | WEIGHT: 155 LBS | SYSTOLIC BLOOD PRESSURE: 146 MMHG | HEIGHT: 69 IN | RESPIRATION RATE: 14 BRPM | TEMPERATURE: 97.7 F | OXYGEN SATURATION: 98 %

## 2022-11-30 PROCEDURE — 99205 OFFICE O/P NEW HI 60 MIN: CPT

## 2022-11-30 RX ORDER — AZITHROMYCIN 250 MG/1
250 TABLET, FILM COATED ORAL
Qty: 6 | Refills: 0 | Status: ACTIVE | COMMUNITY
Start: 2022-07-18

## 2022-11-30 RX ORDER — IBUPROFEN 600 MG/1
600 TABLET, FILM COATED ORAL
Qty: 20 | Refills: 0 | Status: ACTIVE | COMMUNITY
Start: 2022-09-16

## 2022-11-30 RX ORDER — RAMIPRIL 5 MG/1
5 CAPSULE ORAL
Qty: 90 | Refills: 0 | Status: ACTIVE | COMMUNITY
Start: 2022-09-05

## 2022-11-30 RX ORDER — OXYCODONE AND ACETAMINOPHEN 5; 325 MG/1; MG/1
5-325 TABLET ORAL
Qty: 20 | Refills: 0 | Status: ACTIVE | COMMUNITY
Start: 2022-09-16

## 2022-11-30 RX ORDER — METFORMIN ER 500 MG 500 MG/1
500 TABLET ORAL
Qty: 360 | Refills: 0 | Status: ACTIVE | COMMUNITY
Start: 2022-09-05

## 2022-11-30 RX ORDER — DOCUSATE SODIUM 100 MG/1
100 CAPSULE, LIQUID FILLED ORAL
Qty: 60 | Refills: 0 | Status: ACTIVE | COMMUNITY
Start: 2022-09-16

## 2022-11-30 NOTE — HISTORY OF PRESENT ILLNESS
[de-identified] : OMAR ABRBOSA is a 57 year old female with a PMH significant for DM, HTN, HLD, and cholecystectomy (9/2022). \par \par 10/19/22: She presents today for evaluation of elevated LFTs. On chart review, pt noted to have elevated liver enzymes starting in July 2021. Pt reports she took 2 Sudafed tablets at that time which caused scleral icterus. Since that time LFTs have remained elevated. Labs 9/28/22 - bilirubin 1.3, AST 51, ALT 79, , , iron studies normal except ferritin elevated to 174, HCV negative. MRI Abdomen non contrast 9/10/22 - liver normal, no focal lesions, CBD normal.\par \par Denies fatigue, malaise, arthralgias, myalgias, pruritus, recent infection, abdominal pain or distension, jaundice, hematemesis, hematochezia, dark urine, confusion, unintentional weight loss or gain. Denies excessive alcohol consumption. Denies OTC/herbal supplements. Denies viral hepatitis risk factors. Denies new medication in the last year. Denies family history of liver disease, sudden death or late trimester miscarriages. \par \par 11/30/22: Labs done 10/19/22 reviewed with pt. Bilirubin 1.0, AST 43, ALT 52,  (69% Liver, 31% Bone), , INR 1.11, AFP 4.6, Alpha 1 normal, ceruloplasmin normal, HAV and HBV negative, pt is not immune to HBV, Autoimmune serologies negative. Pt reports she is feeling well. She has stopped taking all vitamin supplements since last visit.

## 2022-11-30 NOTE — ASSESSMENT
[FreeTextEntry1] : OMAR BARBOSA is a 57 year old female with a PMH significant for DM, HTN, HLD, and cholecystectomy (9/2022). \par \par Elevated LFTs\par -DILI vs. Autoimmune Hepatitis\par -Labs have ruled out competing etiologies of liver disease but LFTs remain elevated. LFT elevation started in July 2021 after taking 2 Sudafed tablets which resulted in scleral icterus. LFTs remain elevated more than 1 year later.\par -MRI abdomen unremarkable\par -Pt has stopped all vitamin supplements since last visit. We will repeat LFTs and INR today. If LFTs are still elevated we will proceed with the Liver biopsy. Liver biopsy explained to pt and pt is agreeable to proceed with it.\par \par Follow up in about 1 month, after liver biopsy is done.

## 2022-12-01 LAB
ALBUMIN SERPL ELPH-MCNC: 5.2 G/DL
ALP BLD-CCNC: 293 U/L
ALT SERPL-CCNC: 66 U/L
AST SERPL-CCNC: 39 U/L
BILIRUB DIRECT SERPL-MCNC: 0.3 MG/DL
BILIRUB INDIRECT SERPL-MCNC: 0.9 MG/DL
BILIRUB SERPL-MCNC: 1.2 MG/DL
INR PPP: 0.96 RATIO
PROT SERPL-MCNC: 7.8 G/DL
PT BLD: 11.3 SEC

## 2022-12-02 ENCOUNTER — NON-APPOINTMENT (OUTPATIENT)
Age: 57
End: 2022-12-02

## 2022-12-02 DIAGNOSIS — R74.8 ABNORMAL LEVELS OF OTHER SERUM ENZYMES: ICD-10-CM

## 2022-12-28 ENCOUNTER — APPOINTMENT (OUTPATIENT)
Dept: GASTROENTEROLOGY | Facility: CLINIC | Age: 57
End: 2022-12-28

## 2022-12-30 ENCOUNTER — NON-APPOINTMENT (OUTPATIENT)
Age: 57
End: 2022-12-30

## 2022-12-30 LAB
ALBUMIN SERPL ELPH-MCNC: 4.7 G/DL
ALP BLD-CCNC: 295 U/L
ALT SERPL-CCNC: 106 U/L
AST SERPL-CCNC: 65 U/L
BILIRUB DIRECT SERPL-MCNC: 0.3 MG/DL
BILIRUB INDIRECT SERPL-MCNC: 0.9 MG/DL
BILIRUB SERPL-MCNC: 1.2 MG/DL
PROT SERPL-MCNC: 7.2 G/DL

## 2023-05-09 ENCOUNTER — APPOINTMENT (OUTPATIENT)
Dept: CT IMAGING | Facility: CLINIC | Age: 58
End: 2023-05-09
Payer: COMMERCIAL

## 2023-05-09 PROCEDURE — 74177 CT ABD & PELVIS W/CONTRAST: CPT

## 2023-10-05 ENCOUNTER — OFFICE (OUTPATIENT)
Dept: URBAN - METROPOLITAN AREA CLINIC 63 | Facility: CLINIC | Age: 58
Setting detail: OPHTHALMOLOGY
End: 2023-10-05
Payer: COMMERCIAL

## 2023-10-05 DIAGNOSIS — H35.033: ICD-10-CM

## 2023-10-05 PROCEDURE — 92014 COMPRE OPH EXAM EST PT 1/>: CPT | Performed by: STUDENT IN AN ORGANIZED HEALTH CARE EDUCATION/TRAINING PROGRAM

## 2023-10-05 ASSESSMENT — TONOMETRY
OD_IOP_MMHG: 17
OS_IOP_MMHG: 16

## 2023-10-05 ASSESSMENT — CONFRONTATIONAL VISUAL FIELD TEST (CVF)
OS_FINDINGS: FULL
OD_FINDINGS: FULL

## 2023-10-07 ASSESSMENT — AXIALLENGTH_DERIVED
OS_AL: 23.1055
OD_AL: 33.96

## 2023-10-07 ASSESSMENT — REFRACTION_AUTOREFRACTION
OD_CYLINDER: -0.75
OS_SPHERE: 0.00
OS_CYLINDER: -0.75
OD_AXIS: 051
OS_AXIS: 029
OD_SPHERE: -20.00

## 2023-10-07 ASSESSMENT — KERATOMETRY
OD_K1POWER_DIOPTERS: 45.00
OS_K2POWER_DIOPTERS: 45.75
OD_K2POWER_DIOPTERS: 46.00
OD_AXISANGLE_DEGREES: 044
OS_AXISANGLE_DEGREES: 108
OS_K1POWER_DIOPTERS: 44.75

## 2023-10-07 ASSESSMENT — VISUAL ACUITY
OS_BCVA: HM
OD_BCVA: 20/20

## 2023-10-07 ASSESSMENT — SPHEQUIV_DERIVED
OS_SPHEQUIV: -0.375
OD_SPHEQUIV: -20.375

## 2023-10-30 ENCOUNTER — OFFICE (OUTPATIENT)
Dept: URBAN - METROPOLITAN AREA CLINIC 104 | Facility: CLINIC | Age: 58
Setting detail: OPHTHALMOLOGY
End: 2023-10-30
Payer: COMMERCIAL

## 2023-10-30 DIAGNOSIS — H25.11: ICD-10-CM

## 2023-10-30 DIAGNOSIS — H35.033: ICD-10-CM

## 2023-10-30 DIAGNOSIS — H25.13: ICD-10-CM

## 2023-10-30 DIAGNOSIS — E11.9: ICD-10-CM

## 2023-10-30 PROBLEM — H25.12 CATARACT SENILE NUCLEAR SCLEROSIS; RIGHT EYE, LEFT EYE, BOTH EYES: Status: ACTIVE | Noted: 2023-10-30

## 2023-10-30 PROCEDURE — 92134 CPTRZ OPH DX IMG PST SGM RTA: CPT | Performed by: OPHTHALMOLOGY

## 2023-10-30 PROCEDURE — 92014 COMPRE OPH EXAM EST PT 1/>: CPT | Performed by: OPHTHALMOLOGY

## 2023-10-30 PROCEDURE — 92136 OPHTHALMIC BIOMETRY: CPT | Performed by: OPHTHALMOLOGY

## 2023-10-30 ASSESSMENT — KERATOMETRY
OS_K1POWER_DIOPTERS: 45.12
OS_CYLPOWER_DEGREES: 0.43
OD_CYLPOWER_DEGREES: 1.4
OS_K2POWER_DIOPTERS: 45.55
OS_AXISANGLE_DEGREES: 33
OS_AXISANGLE2_DEGREES: 123
OD_K1POWER_DIOPTERS: 44.64
OS_K1K2_AVERAGE: 45.335
OD_AXISANGLE2_DEGREES: 034
OD_AXISANGLE_DEGREES: 124
OD_K1POWER_DIOPTERS: 44.64
OD_K1K2_AVERAGE: 45.34
OD_K2POWER_DIOPTERS: 46.04
OD_AXISANGLE_DEGREES: 034
OD_CYLAXISANGLE_DEGREES: 034
OS_K2POWER_DIOPTERS: 45.55
OS_AXISANGLE_DEGREES: 123
OS_CYLAXISANGLE_DEGREES: 123
OS_K1POWER_DIOPTERS: 45.12
OD_K2POWER_DIOPTERS: 46.04

## 2023-10-30 ASSESSMENT — SPHEQUIV_DERIVED
OS_SPHEQUIV: -0.875
OS_SPHEQUIV: -0.75
OD_SPHEQUIV: -20.375

## 2023-10-30 ASSESSMENT — CONFRONTATIONAL VISUAL FIELD TEST (CVF)
OS_FINDINGS: FULL
OD_FINDINGS: FULL

## 2023-10-30 ASSESSMENT — REFRACTION_AUTOREFRACTION
OD_AXIS: 051
OS_CYLINDER: -0.75
OS_AXIS: 050
OD_SPHERE: -20.00
OS_SPHERE: -0.50
OD_CYLINDER: -0.75

## 2023-10-30 ASSESSMENT — VISUAL ACUITY
OS_BCVA: 20/LP
OD_BCVA: 20/40-2

## 2023-10-30 ASSESSMENT — REFRACTION_MANIFEST
OS_SPHERE: -0.50
OS_CYLINDER: -0.50
OS_ADD: +2.75
OD_VA1: 20/NI
OS_AXIS: 050
OD_ADD: +2.75
OD_SPHERE: -5.00
OS_VA1: 20/20

## 2023-10-30 ASSESSMENT — AXIALLENGTH_DERIVED
OS_AL: 23.26
OD_AL: 34.08
OS_AL: 23.22

## 2023-11-17 ENCOUNTER — OFFICE (OUTPATIENT)
Dept: URBAN - METROPOLITAN AREA CLINIC 104 | Facility: CLINIC | Age: 58
Setting detail: OPHTHALMOLOGY
End: 2023-11-17
Payer: MEDICAID

## 2023-11-17 DIAGNOSIS — E11.9: ICD-10-CM

## 2023-11-17 DIAGNOSIS — H25.13: ICD-10-CM

## 2023-11-17 DIAGNOSIS — H35.033: ICD-10-CM

## 2023-11-17 PROCEDURE — 92134 CPTRZ OPH DX IMG PST SGM RTA: CPT | Performed by: SPECIALIST

## 2023-11-17 PROCEDURE — 92014 COMPRE OPH EXAM EST PT 1/>: CPT | Performed by: SPECIALIST

## 2023-11-17 ASSESSMENT — CONFRONTATIONAL VISUAL FIELD TEST (CVF)
OD_FINDINGS: FULL
OS_FINDINGS: FULL

## 2023-11-17 ASSESSMENT — REFRACTION_AUTOREFRACTION
OD_CYLINDER: -0.75
OD_AXIS: 051
OS_SPHERE: -0.50
OD_SPHERE: -20.00
OS_CYLINDER: -0.75
OS_AXIS: 050

## 2023-11-17 ASSESSMENT — SPHEQUIV_DERIVED
OD_SPHEQUIV: -20.375
OS_SPHEQUIV: -0.875

## 2023-11-20 ENCOUNTER — OFFICE (OUTPATIENT)
Dept: URBAN - METROPOLITAN AREA CLINIC 94 | Facility: CLINIC | Age: 58
Setting detail: OPHTHALMOLOGY
End: 2023-11-20
Payer: MEDICAID

## 2023-11-20 DIAGNOSIS — H25.13: ICD-10-CM

## 2023-11-20 PROCEDURE — 76512 OPH US DX B-SCAN: CPT | Performed by: SPECIALIST

## 2023-11-20 PROCEDURE — 92134 CPTRZ OPH DX IMG PST SGM RTA: CPT | Performed by: SPECIALIST

## 2023-11-20 ASSESSMENT — CONFRONTATIONAL VISUAL FIELD TEST (CVF)
OD_FINDINGS: FULL
OS_FINDINGS: FULL

## 2023-11-22 ASSESSMENT — SPHEQUIV_DERIVED
OD_SPHEQUIV: -20.375
OS_SPHEQUIV: -0.875

## 2023-11-22 ASSESSMENT — REFRACTION_AUTOREFRACTION
OS_AXIS: 050
OS_SPHERE: -0.50
OD_CYLINDER: -0.75
OS_CYLINDER: -0.75
OD_SPHERE: -20.00
OD_AXIS: 051

## 2023-12-08 ENCOUNTER — RX ONLY (RX ONLY)
Age: 58
End: 2023-12-08

## 2023-12-08 ENCOUNTER — ASC (OUTPATIENT)
Dept: URBAN - METROPOLITAN AREA SURGERY 8 | Facility: SURGERY | Age: 58
Setting detail: OPHTHALMOLOGY
End: 2023-12-08
Payer: MEDICAID

## 2023-12-08 DIAGNOSIS — H52.211: ICD-10-CM

## 2023-12-08 DIAGNOSIS — H25.11: ICD-10-CM

## 2023-12-08 PROCEDURE — FEMTO FEMTOSECOND LASER: Mod: GY | Performed by: OPHTHALMOLOGY

## 2023-12-08 PROCEDURE — 66984 XCAPSL CTRC RMVL W/O ECP: CPT | Mod: RT | Performed by: OPHTHALMOLOGY

## 2023-12-09 ENCOUNTER — OFFICE (OUTPATIENT)
Dept: URBAN - METROPOLITAN AREA CLINIC 104 | Facility: CLINIC | Age: 58
Setting detail: OPHTHALMOLOGY
End: 2023-12-09

## 2023-12-09 DIAGNOSIS — Z96.1: ICD-10-CM

## 2023-12-09 PROCEDURE — 99024 POSTOP FOLLOW-UP VISIT: CPT | Performed by: OPTOMETRIST

## 2023-12-09 ASSESSMENT — CORNEAL EDEMA CLINICAL DESCRIPTION: OD_CORNEALEDEMA: T

## 2023-12-09 ASSESSMENT — REFRACTION_AUTOREFRACTION
OD_CYLINDER: -0.50
OD_SPHERE: -1.25
OD_AXIS: 110
OS_AXIS: 050
OS_SPHERE: -0.50
OS_CYLINDER: -0.75

## 2023-12-09 ASSESSMENT — CONFRONTATIONAL VISUAL FIELD TEST (CVF)
OD_FINDINGS: FULL
OS_FINDINGS: FULL

## 2023-12-09 ASSESSMENT — SPHEQUIV_DERIVED
OS_SPHEQUIV: -0.875
OD_SPHEQUIV: -1.5

## 2023-12-15 ENCOUNTER — OFFICE (OUTPATIENT)
Dept: URBAN - METROPOLITAN AREA CLINIC 104 | Facility: CLINIC | Age: 58
Setting detail: OPHTHALMOLOGY
End: 2023-12-15
Payer: MEDICAID

## 2023-12-15 DIAGNOSIS — Z96.1: ICD-10-CM

## 2023-12-15 DIAGNOSIS — H25.812: ICD-10-CM

## 2023-12-15 PROCEDURE — 92136 OPHTHALMIC BIOMETRY: CPT | Performed by: OPHTHALMOLOGY

## 2023-12-15 PROCEDURE — 99024 POSTOP FOLLOW-UP VISIT: CPT | Performed by: OPTOMETRIST

## 2023-12-15 ASSESSMENT — CORNEAL EDEMA CLINICAL DESCRIPTION
OD_CORNEALEDEMA: ABSENT
OD_CORNEALEDEMA: ABSENT

## 2023-12-15 ASSESSMENT — REFRACTION_AUTOREFRACTION
OD_SPHERE: +0.50
OS_SPHERE: -0.25
OD_AXIS: 093
OS_CYLINDER: -1.00
OD_CYLINDER: -0.75
OD_AXIS: 093
OD_SPHERE: +0.50
OD_CYLINDER: -0.75
OS_SPHERE: -0.25
OS_CYLINDER: -1.00
OS_AXIS: 065
OS_AXIS: 065

## 2023-12-15 ASSESSMENT — SPHEQUIV_DERIVED
OS_SPHEQUIV: -0.75
OD_SPHEQUIV: 0.125
OS_SPHEQUIV: -0.75
OD_SPHEQUIV: 0.125

## 2023-12-15 ASSESSMENT — CONFRONTATIONAL VISUAL FIELD TEST (CVF)
OS_FINDINGS: FULL
OD_FINDINGS: FULL

## 2023-12-20 ENCOUNTER — ASC (OUTPATIENT)
Dept: URBAN - METROPOLITAN AREA SURGERY 8 | Facility: SURGERY | Age: 58
Setting detail: OPHTHALMOLOGY
End: 2023-12-20
Payer: MEDICAID

## 2023-12-20 DIAGNOSIS — H25.12: ICD-10-CM

## 2023-12-20 PROCEDURE — 66984 XCAPSL CTRC RMVL W/O ECP: CPT | Mod: 79,LT | Performed by: OPHTHALMOLOGY

## 2023-12-21 ENCOUNTER — RX ONLY (RX ONLY)
Age: 58
End: 2023-12-21

## 2023-12-21 ENCOUNTER — OFFICE (OUTPATIENT)
Dept: URBAN - METROPOLITAN AREA CLINIC 104 | Facility: CLINIC | Age: 58
Setting detail: OPHTHALMOLOGY
End: 2023-12-21
Payer: MEDICAID

## 2023-12-21 DIAGNOSIS — Z96.1: ICD-10-CM

## 2023-12-21 PROBLEM — H25.812 CATARACT SENILE NUCLEAR SCLEROSIS; LEFT EYE: Status: RESOLVED | Noted: 2023-12-09 | Resolved: 2023-12-21

## 2023-12-21 PROCEDURE — 99024 POSTOP FOLLOW-UP VISIT: CPT | Performed by: OPTOMETRIST

## 2023-12-21 ASSESSMENT — REFRACTION_AUTOREFRACTION
OD_AXIS: 105
OD_SPHERE: +0.50
OD_CYLINDER: -0.75
OS_SPHERE: -1.50
OS_AXIS: 147
OS_CYLINDER: -0.50

## 2023-12-21 ASSESSMENT — SPHEQUIV_DERIVED
OS_SPHEQUIV: -1.75
OD_SPHEQUIV: 0.125

## 2023-12-21 ASSESSMENT — CORNEAL EDEMA CLINICAL DESCRIPTION: OD_CORNEALEDEMA: ABSENT

## 2023-12-28 ENCOUNTER — OFFICE (OUTPATIENT)
Dept: URBAN - METROPOLITAN AREA CLINIC 104 | Facility: CLINIC | Age: 58
Setting detail: OPHTHALMOLOGY
End: 2023-12-28

## 2023-12-28 DIAGNOSIS — Z96.1: ICD-10-CM

## 2023-12-28 PROCEDURE — 99024 POSTOP FOLLOW-UP VISIT: CPT | Performed by: OPHTHALMOLOGY

## 2023-12-28 ASSESSMENT — CORNEAL EDEMA CLINICAL DESCRIPTION: OD_CORNEALEDEMA: ABSENT

## 2023-12-28 ASSESSMENT — REFRACTION_AUTOREFRACTION
OD_CYLINDER: -0.25
OS_CYLINDER: SPH
OD_AXIS: 115
OS_SPHERE: -0.25
OD_SPHERE: +0.25

## 2023-12-28 ASSESSMENT — SPHEQUIV_DERIVED: OD_SPHEQUIV: 0.125

## 2023-12-28 ASSESSMENT — CONFRONTATIONAL VISUAL FIELD TEST (CVF)
OS_FINDINGS: FULL
OD_FINDINGS: FULL

## 2024-10-16 NOTE — PRE-OP CHECKLIST - ALLERGIES REVIEWED
What Type Of Note Output Would You Prefer (Optional)?: Bullet Format Hpi Title: Evaluation of Skin Lesions How Severe Are Your Spot(S)?: moderate Have Your Spot(S) Been Treated In The Past?: has not been treated Additional History: Full body skin exam done

## (undated) DEVICE — PACK GENERAL LAPAROSCOPY

## (undated) DEVICE — XI SEAL UNIV 5- 8 MM

## (undated) DEVICE — XI DRAPE COLUMN

## (undated) DEVICE — SOL IRR POUR H2O 1000ML

## (undated) DEVICE — DRAPE 3/4 SHEET W REINFORCEMENT 56X77"

## (undated) DEVICE — XI DRAPE ARM

## (undated) DEVICE — VENODYNE/SCD SLEEVE CALF LARGE

## (undated) DEVICE — DRSG MASTISOL

## (undated) DEVICE — WARMING BLANKET UPPER ADULT

## (undated) DEVICE — SOL IRR BAG NS 0.9% 3000ML

## (undated) DEVICE — D HELP - CLEARVIEW CLEARIFY SYSTEM

## (undated) DEVICE — XI ENDOWRIST 12 - 8 MM CANNULA REDUCER

## (undated) DEVICE — XI TIP COVER

## (undated) DEVICE — DRSG CURITY GAUZE SPONGE 4 X 4" 12-PLY

## (undated) DEVICE — XI OBTURATOR OPTICAL BLADELESS 8MM

## (undated) DEVICE — ELCTR BOVIE TIP BLADE INSULATED 2.75" EDGE

## (undated) DEVICE — PLV/PSP-ESU FORCETRIAD TOL19868E: Type: DURABLE MEDICAL EQUIPMENT

## (undated) DEVICE — LIGASURE MARYLAND 37CM

## (undated) DEVICE — SOL IRR BAG NS 0.9% 1000ML

## (undated) DEVICE — SOL IRR POUR NS 0.9% 1000ML

## (undated) DEVICE — TROCAR COVIDIEN VERSAONE BLUNT TIP HASSAN 12MM

## (undated) DEVICE — SUT MONOCRYL 4-0 27" PS-2 UNDYED

## (undated) DEVICE — SUT POLYSORB 0 30" GU-46

## (undated) DEVICE — BLADE SCALPEL SAFETYLOCK #15

## (undated) DEVICE — TUBING STRYKER PNEUMOCLEAR HIGH FLOW

## (undated) DEVICE — DRAPE TOWEL BLUE 17" X 24"

## (undated) DEVICE — DRSG OPSITE 2.5 X 2"

## (undated) DEVICE — NDL HYPO SAFE 25G X 1.5" (ORANGE)

## (undated) DEVICE — VENODYNE/SCD SLEEVE CALF MEDIUM

## (undated) DEVICE — ENDOCATCH 10MM SPECIMEN POUCH

## (undated) DEVICE — SMOKE EVACUTATION SYS LAPROSCOPIC AC/PA

## (undated) DEVICE — XI 12MM AND STAPLER CANNULA SEAL

## (undated) DEVICE — XI VESSEL SEALER

## (undated) DEVICE — PLV-SCD MACHINE: Type: DURABLE MEDICAL EQUIPMENT

## (undated) DEVICE — GLV 8 PROTEXIS (WHITE)

## (undated) DEVICE — SUT POLYSORB 2-0 30" V-20 UNDYED